# Patient Record
Sex: FEMALE | URBAN - NONMETROPOLITAN AREA
[De-identification: names, ages, dates, MRNs, and addresses within clinical notes are randomized per-mention and may not be internally consistent; named-entity substitution may affect disease eponyms.]

---

## 2023-07-14 ENCOUNTER — TELEPHONE (OUTPATIENT)
Dept: NEUROSURGERY | Age: 53
End: 2023-07-14

## 2023-07-14 NOTE — TELEPHONE ENCOUNTER
[facility] Neurosurgery New Patient Questionnaire    Diagnosis/Reason for Referral?     DX: M54.12 (ICD-10-CM) - Radiculopathy, cervical region    2. Who is completing questionnaire? Patient X Caregiver Family      3. Has the patient had any previous spinal/brain surgeries? NO        A. If yes, what is the name of the facility in which the surgery was performed? B. Procedure/Surgery performed? C. Who was the surgeon? D. When was the surgery? MM/YY       E. Did the patient improve after the surgery? 4. Is this a second opinion? NO   If yes, Dr. Gerardine Curling would like to review patient first before making the appointment. 5. Have MRI Images been obtain within the last year? Yes  No      XR X  CT     If yes, where was the imaging performed? [facility]   If yes, what part of the body? Lumbar  Cervical X Thoracic  Brain     If yes, when was it obtained? 6/12/2023    Note: if the scan was performed at a facility other than [address] [place] [address], the disc will need to be brought to the appointment or we need to reach out to obtain the disc. A. Was the patient instructed to provide the disc? Yes X  No      8. Has the patient had a NCV/EMG within the last year? Yes  No X     If yes, where was it performed and date? MM/YY  Location:      9. Has the patient been to Physical Therapy? Yes X  No     If yes, what location, how long attended, and last visit? Location: [facility]       Therapy Lasted: 3 WEEKS    Date of Last Visit: 7/7/2023      10. Has the patient been to Pain Management? Yes  No X     If yes, what location and last visit     Location:   Last Visit:   Is it helping?

## 2023-07-19 ENCOUNTER — TELEPHONE (OUTPATIENT)
Dept: NEUROSURGERY | Age: 53
End: 2023-07-19

## 2023-07-19 ENCOUNTER — OFFICE VISIT (OUTPATIENT)
Dept: NEUROSURGERY | Age: 53
End: 2023-07-19
Payer: COMMERCIAL

## 2023-07-19 VITALS
OXYGEN SATURATION: 99 % | WEIGHT: 186 LBS | DIASTOLIC BLOOD PRESSURE: 72 MMHG | BODY MASS INDEX: 31.76 KG/M2 | HEIGHT: 64 IN | HEART RATE: 68 BPM | RESPIRATION RATE: 18 BRPM | SYSTOLIC BLOOD PRESSURE: 108 MMHG

## 2023-07-19 DIAGNOSIS — R29.898 RIGHT ARM WEAKNESS: ICD-10-CM

## 2023-07-19 DIAGNOSIS — R20.8 DECREASED SENSATION: ICD-10-CM

## 2023-07-19 DIAGNOSIS — R20.8 BURNING SENSATION: ICD-10-CM

## 2023-07-19 DIAGNOSIS — M54.12 CERVICAL RADICULOPATHY: ICD-10-CM

## 2023-07-19 DIAGNOSIS — M50.30 DDD (DEGENERATIVE DISC DISEASE), CERVICAL: Primary | ICD-10-CM

## 2023-07-19 PROCEDURE — 99204 OFFICE O/P NEW MOD 45 MIN: CPT | Performed by: NURSE PRACTITIONER

## 2023-07-19 RX ORDER — ESTRADIOL 0.5 MG/1
0.5 TABLET ORAL DAILY
COMMUNITY
Start: 2023-07-01

## 2023-07-19 RX ORDER — TRAMADOL HYDROCHLORIDE 50 MG/1
50 TABLET ORAL DAILY
COMMUNITY
Start: 2023-06-12 | End: 2023-07-19

## 2023-07-19 RX ORDER — GABAPENTIN 300 MG/1
300 CAPSULE ORAL 3 TIMES DAILY
COMMUNITY
Start: 2023-06-12 | End: 2023-07-19

## 2023-07-19 ASSESSMENT — ENCOUNTER SYMPTOMS
EYES NEGATIVE: 1
GASTROINTESTINAL NEGATIVE: 1
RESPIRATORY NEGATIVE: 1

## 2023-07-19 NOTE — PROGRESS NOTES
[facility] Neurosurgery  Office Visit      Chief Complaint   Patient presents with    New Patient     Establishing care     Results     Imaging in PACS    Neck Pain     Patient states she had a small accident playing pickleball on 6/2/2023. She states she reached too far out for a ball and felt she strained a muscle in her neck. She states the pain has gradually worsened since then. She experiences a burning sensation from her right shoulder down to her elbow. She states the pain does radiate into her RUE. She denies headache and dizziness with the pain. She isn't currently taking anything at the moment to help manage the pain. Numbness     Patient states she does have numbness/tingling in her RUE. 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 The pain does radiate into the RIGHT triceps, and stops at the elbow. Her pain is mostly located in the RUE. The patient complains of numbness of the 2nd, 3rd, and 4th digits along with a l cold feeling, however, not cold to touch. She does have numbness in the fingertips, trouble using hands to perform fine motor tasks or ataxia. For the past 3 weeks she has had significant weakness of the RUE. This is now affecting her job and ability to hold small objects at work. No real neck pain. Has to raise her RUE above head for relief. The patient has underwent a non-operative treatment course that has included:  NSAIDs (Aleve in AM)  Gabapentin 300 mg - did not tolerate  Opiates - did not take  Oral Steroids (medrol dose pack)  Physical Therapy ([facility], had 3 weeks and numbness, weakness worsened)   Traction - helped during that time  Doing at home stretches and exercises       Of note she does not use tobacco and does not take blood thinning medications. History reviewed. No pertinent past medical history.     Past Surgical History:

## 2023-07-26 NOTE — TELEPHONE ENCOUNTER
Procedures: MRI Cervical     Primary Coverage: [employer]   Secondary Coverage Requires Auth:     Source:Leigh Ann  Case/Tracking/Auth Ref # [id number]     Case Status: Approved     Valid Dates if approved: 7/21/23-8/19/23     Additional Comments: faxed to [facility]

## 2023-07-28 ENCOUNTER — TRANSCRIBE ORDERS (OUTPATIENT)
Dept: ADMINISTRATIVE | Facility: HOSPITAL | Age: 53
End: 2023-07-28
Payer: COMMERCIAL

## 2023-07-28 ENCOUNTER — TELEPHONE (OUTPATIENT)
Dept: NEUROSURGERY | Age: 53
End: 2023-07-28

## 2023-07-28 DIAGNOSIS — M50.30 DDD (DEGENERATIVE DISC DISEASE), CERVICAL: Primary | ICD-10-CM

## 2023-07-28 NOTE — TELEPHONE ENCOUNTER
Patient called requesting update on physical therapy appointment at  [facility]. ;

## 2023-07-31 NOTE — TELEPHONE ENCOUNTER
Spoke with Luis E Edmondson on Friday July 28th, in regards to her MRI and her physical Therapy. I explained to Luis E Edmondson that referral was sent and that [facility] office is closed on Friday's and that they should contact her upcoming week. Patient voiced understanding.

## 2023-08-05 ENCOUNTER — HOSPITAL ENCOUNTER (OUTPATIENT)
Dept: MRI IMAGING | Facility: HOSPITAL | Age: 53
Discharge: HOME | End: 2023-08-05
Admitting: NURSE PRACTITIONER
Payer: COMMERCIAL

## 2023-08-05 DIAGNOSIS — M50.30 DDD (DEGENERATIVE DISC DISEASE), CERVICAL: ICD-10-CM

## 2023-08-05 PROCEDURE — 72141 MRI NECK SPINE W/O DYE: CPT

## 2023-08-08 ENCOUNTER — OFFICE VISIT (OUTPATIENT)
Dept: NEUROSURGERY | Age: 53
End: 2023-08-08
Payer: COMMERCIAL

## 2023-08-08 VITALS
HEIGHT: 64 IN | SYSTOLIC BLOOD PRESSURE: 118 MMHG | BODY MASS INDEX: 31.76 KG/M2 | OXYGEN SATURATION: 98 % | DIASTOLIC BLOOD PRESSURE: 78 MMHG | WEIGHT: 186 LBS | RESPIRATION RATE: 18 BRPM | HEART RATE: 84 BPM

## 2023-08-08 DIAGNOSIS — M48.02 FORAMINAL STENOSIS OF CERVICAL REGION: Primary | ICD-10-CM

## 2023-08-08 DIAGNOSIS — M54.12 CERVICAL RADICULOPATHY: ICD-10-CM

## 2023-08-08 DIAGNOSIS — M50.30 DDD (DEGENERATIVE DISC DISEASE), CERVICAL: ICD-10-CM

## 2023-08-08 DIAGNOSIS — R29.898 RIGHT ARM WEAKNESS: ICD-10-CM

## 2023-08-08 PROCEDURE — 99213 OFFICE O/P EST LOW 20 MIN: CPT | Performed by: NEUROLOGICAL SURGERY

## 2023-08-08 RX ORDER — MELOXICAM 15 MG/1
1 TABLET ORAL DAILY PRN
COMMUNITY

## 2023-08-08 ASSESSMENT — ENCOUNTER SYMPTOMS
RESPIRATORY NEGATIVE: 1
GASTROINTESTINAL NEGATIVE: 1
EYES NEGATIVE: 1

## 2023-08-08 NOTE — PROGRESS NOTES
Review of Systems   Constitutional: Negative. HENT: Negative. Eyes: Negative. Respiratory: Negative. Cardiovascular: Negative. Gastrointestinal: Negative. Genitourinary: Negative. Musculoskeletal:  Positive for joint pain, myalgias and neck pain. Skin: Negative. Neurological:  Positive for tingling. Endo/Heme/Allergies: Negative. Psychiatric/Behavioral: Negative.
Medications   Medication Sig Dispense Refill    meloxicam (MOBIC) 15 MG tablet Take 1 tablet by mouth daily as needed      estradiol (ESTRACE) 0.5 MG tablet Take 1 tablet by mouth daily       No current facility-administered medications for this visit. Allergies:  Patient has no known allergies. Social History:   Social History     Tobacco Use   Smoking Status Never   Smokeless Tobacco Never     Social History     Substance and Sexual Activity   Alcohol Use None         Family History:   No family history on file. REVIEW OF SYSTEMS:  Constitutional: Negative. HENT: Negative. Eyes: Negative. Respiratory: Negative. Cardiovascular: Negative. Gastrointestinal: Negative. Genitourinary: Negative. Musculoskeletal:  Positive for joint pain, myalgias and neck pain. Skin: Negative. Neurological:  Positive for tingling. Endo/Heme/Allergies: Negative. Psychiatric/Behavioral: Negative. PHYSICAL EXAM:  Vitals:    08/08/23 1411   BP: 118/78   Pulse: 84   Resp: 18   SpO2: 98%     Constitutional: appears well-developed and well-nourished. Eyes - conjunctiva normal.  Pupils react to light  Ear, nose, throat - hearing intact to finger rub, No scars, masses, or lesions over external nose or ears, no atrophy oftongue  Neck- symmetric, no masses noted, no jugular vein distension  Respiration- chest wall appears symmetric, good expansion, normal effort without use of accessory muscles  Musculoskeletal - no significant wasting of muscles noted, no bony deformities, gait no gross ataxia  Extremities- no clubbing, cyanosis oredema  Skin - warm, dry, and intact. No rash, erythema, or pallor.   Psychiatric - mood, affect, and behavior appear normal.     Neurologic Examination  Awake, Alert and oriented x 4  Normal speech pattern, following commands    Motor:  RIGHT: hand grasp 5/5    finger extension 5/5    bicep 5/5     triceps 4+/5    deltoid 5/5 (Improved since last visit)        LEFT:

## 2023-11-01 ENCOUNTER — TELEPHONE (OUTPATIENT)
Dept: NEUROSURGERY | Age: 53
End: 2023-11-01